# Patient Record
(demographics unavailable — no encounter records)

---

## 2024-11-22 NOTE — HISTORY OF PRESENT ILLNESS
[de-identified] : fell asleep at  [FreeTextEntry6] : 2y8m old girl BIB mother after she fell asleep during craft time at  earlier today. Mother says that  staff thinks she has been quiet this week. Mother is concerned about walking pneumonia. Pt is without other symptoms. No fever. No SOB, difficulty breathing, chest pain, cough, congestion or URI sx. No n/v/d. No headache, abdominal pain, sore throat or rash. No body aches or fatigue. No urinary complaints. Good po/uop/bm. Normal sleep and activity.

## 2024-11-22 NOTE — DISCUSSION/SUMMARY
[FreeTextEntry1] : Anticipatory guidance and parent education given. No focal findings on PE. Supportive care. Follow up as needed for persistent or worsening symptoms.

## 2025-01-18 NOTE — DEVELOPMENTAL MILESTONES
[Yes: _______] : yes, [unfilled] [Plays pretend with toys or dolls] : plays pretend with toys or dolls [Pokes food with fork] : pokes food with fork [Walks up steps, using one] : walks up steps, using one foot, then the other [Runs well without falling] : runs well without falling [Grasps crayon with thumb] : grasps crayon with thumb and fingers instead of fist [Catches a large ball] : catches a large ball [Copies a vertical line] : copies a vertical line [Yes] : Completed. [Urinates in a potty or toilet] : does not urinate in a potty or toilet [Uses pronouns correctly] : does not use pronouns correctly [Explains the reason for things,] : does not explain the reason for things, such as needing a sweater when it's cold [Names at least one color] : does not name at least one color

## 2025-01-18 NOTE — DISCUSSION/SUMMARY
[Normal Growth] : growth [None] : No known medical problems [No Elimination Concerns] : elimination [No Feeding Concerns] : feeding [No Skin Concerns] : skin [Normal Sleep Pattern] : sleep [Delayed Social Skills] : delayed social skills [Delayed Language Skills] : delayed language skills [No Medications] : ~He/She~ is not on any medications [Parent/Guardian] : parent/guardian [] : The components of the vaccine(s) to be administered today are listed in the plan of care. The disease(s) for which the vaccine(s) are intended to prevent and the risks have been discussed with the caretaker.  The risks are also included in the appropriate vaccination information statements which have been provided to the patient's caregiver.  The caregiver has given consent to vaccinate. [FreeTextEntry1] : Anticipatory guidance and parent education given. Continue cow's milk. Continue table foods, 3 meals with 2-3 snacks per day. Incorporate water daily in a sippy cup. Brush teeth twice a day with soft toothbrush. Recommend visit to dentist. Fluoride daily. When in car, keep child in rear-facing car seats until age 2, or until  the maximum height and weight for seat is reached. Put toddler to sleep in own bed. Help toddler to maintain consistent daily routines and sleep schedule. Toilet training discussed. Ensure home is safe. Use consistent, positive discipline. Read aloud to toddler. Limit screen time to no more than 2 hours per day. CBC, Lead, Ferritin, Celiac panel ordered. Flu vaccine given. Continue ST, special ed. Return in 6 months for PE.

## 2025-01-18 NOTE — HISTORY OF PRESENT ILLNESS
[Parents] : parents [Fruit] : fruit [Vegetables] : vegetables [Meat] : meat [Grains] : grains [Eggs] : eggs [Fish] : fish [Dairy] : dairy [___ stools per day] : [unfilled]  stools per day [Loose] : stools are loose consistency [Normal] : Normal [Sippy cup use] : Sippy cup use [Brushing teeth] : Brushing teeth [Yes] : Patient goes to dentist yearly [Toothpaste] : Primary Fluoride Source: Toothpaste [Playtime (60 min/d)] : Playtime 60 min a day [Temper Tantrums] : Temper Tantrums [< 2 hrs of screen time] : Less than 2 hrs of screen time [No] : No cigarette smoke exposure [Water heater temperature set at <120 degrees F] : Water heater temperature set at <120 degrees F [Car seat in back seat] : Car seat in back seat [Carbon Monoxide Detectors] : Carbon monoxide detectors [Smoke Detectors] : Smoke detectors [Supervised play near cars and streets] : Supervised play near cars and streets [Up to date] : Up to date [FreeTextEntry7] : Doing well [de-identified] : None [FreeTextEntry1] : 2y10m old girl here for routine PE. Doing well. No current concerns. Good po/uop/bm. Mother is concerned that BMs are usually loose and very smelly. No blood or mucus. Normal sleep and activity. Many words, short phrases, understands all. Jumps, climbs stairs, pretend play. Growth wnl. H/O speech and developmental delays, getting speech therapy and special ed. 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

## 2025-01-18 NOTE — PHYSICAL EXAM

## 2025-03-26 NOTE — HISTORY OF PRESENT ILLNESS
[FreeTextEntry6] : Pt BIB Mother for c/o viral sxs and drainage from L ear x 2 days Fevers x 2 days resolved today + b/l myringotomy tubes denies wheezing or trouble breathing denies n/v/d, ST or stomach pain good PO normal sleep

## 2025-03-26 NOTE — DISCUSSION/SUMMARY
[FreeTextEntry1] : Apply ear drops as prescribed.   Symptoms likely due to viral URI. Recommend supportive care including antipyretics, fluids, and nasal saline followed by nasal suction. Return if symptoms worsen or persist. - Symptomatic treatment - Cool moist air /Humidifier - Saline drops - Discussed maintaining adequate hydration - Handwashing and infection control discussed - Close observation advised for worsening symptoms - Return to the office if condition worsens or new symptoms arise - Go to the emergency room if condition worsens and unable to get to the office or during after hours - Next Visit: as needed or if temp > 48 hours

## 2025-03-26 NOTE — PHYSICAL EXAM
[Discharge in canal] : discharge in canal [Left] : (left) [Clear] : right tympanic membrane clear [Myringotomy tube present] : myringotomy tube present [Clear Rhinorrhea] : clear rhinorrhea [NL] : warm, clear

## 2025-06-02 NOTE — HISTORY OF PRESENT ILLNESS
[de-identified] : left eye swelling [FreeTextEntry6] :  Pt woke today with swelling of left upper eyelid   No recent URI; afebrile

## 2025-06-02 NOTE — PHYSICAL EXAM
[Conjuctival Injection] : no conjunctival injection [NL] : regular rate and rhythm, normal S1, S2 audible, no murmurs [FreeTextEntry5] : left upper lid with slight swelling and erythema. + pin-pt area of whitish swelling

## 2025-07-17 NOTE — PHYSICAL EXAM
[NL] : nonerythematous oropharynx [FreeTextEntry3] : + BMT [de-identified] : left hand with 2 sp angioma lesions. No rash c/w coxsackie on hands, feet

## 2025-07-17 NOTE — HISTORY OF PRESENT ILLNESS
[de-identified] : r/o coxsackie [FreeTextEntry6] :  Pt sent home from [place] by nurse yesterday due to lesion on hand- ? coxsackie as per nurse   + coxsackie exposure at camp Pt not ill. No fever